# Patient Record
(demographics unavailable — no encounter records)

---

## 2025-02-21 NOTE — HISTORY OF PRESENT ILLNESS
[FreeTextEntry1] : SAMIRA MONROE is a 64-year-old male, with a PMHx significant for HTN, who presents today for a follow-up visit. Denies any new complaints. Reports he is feeling well. Otherwise: (-) chest pain, (-) SOB.  Social History: (+) smoking (45-pack-year)